# Patient Record
Sex: FEMALE | Race: AMERICAN INDIAN OR ALASKA NATIVE
[De-identification: names, ages, dates, MRNs, and addresses within clinical notes are randomized per-mention and may not be internally consistent; named-entity substitution may affect disease eponyms.]

---

## 2019-09-15 ENCOUNTER — HOSPITAL ENCOUNTER (EMERGENCY)
Dept: HOSPITAL 5 - ED | Age: 19
LOS: 1 days | Discharge: HOME | End: 2019-09-16
Payer: SELF-PAY

## 2019-09-15 DIAGNOSIS — Z79.899: ICD-10-CM

## 2019-09-15 DIAGNOSIS — R11.2: Primary | ICD-10-CM

## 2019-09-15 LAB
BILIRUB UR QL STRIP: (no result)
BLOOD UR QL VISUAL: (no result)
BUN SERPL-MCNC: 10 MG/DL (ref 7–17)
BUN/CREAT SERPL: 17 %
CALCIUM SERPL-MCNC: 9.2 MG/DL (ref 8.4–10.2)
HCT VFR BLD CALC: 36.6 % (ref 30.3–42.9)
HEMOLYSIS INDEX: 26
HGB BLD-MCNC: 11.8 GM/DL (ref 10.1–14.3)
MCHC RBC AUTO-ENTMCNC: 32 % (ref 30–34)
MCV RBC AUTO: 78 FL (ref 79–97)
MUCOUS THREADS #/AREA URNS HPF: (no result) /HPF
PH UR STRIP: 6 [PH] (ref 5–7)
PLATELET # BLD: 277 K/MM3 (ref 140–440)
PROT UR STRIP-MCNC: (no result) MG/DL
RBC # BLD AUTO: 4.7 M/MM3 (ref 3.65–5.03)
RBC #/AREA URNS HPF: 3 /HPF (ref 0–6)
UROBILINOGEN UR-MCNC: < 2 MG/DL (ref ?–2)
WBC #/AREA URNS HPF: 1 /HPF (ref 0–6)

## 2019-09-15 PROCEDURE — 85027 COMPLETE CBC AUTOMATED: CPT

## 2019-09-15 PROCEDURE — 96361 HYDRATE IV INFUSION ADD-ON: CPT

## 2019-09-15 PROCEDURE — 36415 COLL VENOUS BLD VENIPUNCTURE: CPT

## 2019-09-15 PROCEDURE — 81001 URINALYSIS AUTO W/SCOPE: CPT

## 2019-09-15 PROCEDURE — 80048 BASIC METABOLIC PNL TOTAL CA: CPT

## 2019-09-15 PROCEDURE — 81025 URINE PREGNANCY TEST: CPT

## 2019-09-15 PROCEDURE — 99283 EMERGENCY DEPT VISIT LOW MDM: CPT

## 2019-09-15 PROCEDURE — 96374 THER/PROPH/DIAG INJ IV PUSH: CPT

## 2019-09-15 NOTE — EMERGENCY DEPARTMENT REPORT
Vomiting/Diarrhea





- HPI


Chief Complaint: Nausea/Vomiting/Diarrhea


Stated Complaint: N/V/FATIGUE


Time Seen by Provider: 09/15/19 18:26


Duration: 2 Days


Nausea/Vomiting Severity: Moderate


Diarrhea Severity: None


Pain Location: Other (no abdominal pain)


Pain Severity: None


Symptoms: Yes Family w/ Similar Symptoms, No Watery Diarrhea, No Bloody 

diarrhea, No Fever, No Able to Tolerate Fluids, No Recent Unusual Foods, No Re

cent Untreated Water, No Recent use of Antibiotics, No Contacts w/ Similar 

Symptoms, No Rash, No Hematuria, No Recent URI Symptoms


Other History: This is a 18-year-old female here reported that she has been 

having nausea and vomiting for 2 days and she thinks she might be pregnant.  

Last menstrual period was 08/17/2019.  She says she is able to tolerate fluids. 

Denies any diarrhea.  Denies vomiting blood.  She says she vomited twice today 

but she is having mostly nausea.  She says she is able to keep liquids down.  

Denies any fever or chills or any vaginal bleeding or discharge.  Denies any 

urinary burning, frequency or urgency.





ED Review of Systems


ROS: 


Stated complaint: N/V/FATIGUE


Other details as noted in HPI





Constitutional: denies: chills, fever


ENT: denies: throat pain, congestion


Respiratory: denies: cough, shortness of breath, wheezing


Cardiovascular: denies: chest pain, palpitations, edema, syncope


Gastrointestinal: nausea, vomiting.  denies: abdominal pain, diarrhea, 

constipation, hematemesis, melena, hematochezia


Genitourinary: denies: urgency, dysuria, frequency, hematuria, discharge, 

abnormal menses


Musculoskeletal: denies: back pain, joint swelling, arthralgia, myalgia


Skin: denies: rash


Neurological: denies: headache, numbness, paresthesias, abnormal gait





ED Past Medical Hx





- Past Medical History


Previous Medical History?: No





- Surgical History


Past Surgical History?: No





- Family History


Family history: hypertension





- Social History


Smoking Status: Never Smoker


Substance Use Type: None





- Medications


Home Medications: 


                                Home Medications











 Medication  Instructions  Recorded  Confirmed  Last Taken  Type


 


Dicyclomine [Bentyl] 20 mg PO Q8H 3 Days #9 tablet 09/16/19  Unknown Rx


 


Famotidine [Pepcid] 20 mg PO BID 6 Days #12 tablet 09/16/19  Unknown Rx


 


Ondansetron [Zofran Odt] 4 mg PO Q8HR PRN #12 tab.rapdis 09/16/19  Unknown Rx














Vomiting Diarrhea Exam





- Exam


General: 


Vital signs noted. No distress. Alert and acting appropriately.


This is a 18-year-old female well-nourished well-developed in no acute distress.


HEENT: Yes Moist Mucous Membranes, No Pharyngeal Erythema, No Pharyngeal 

Exudates, No Rhinorrhea, No Conjuctival Injection, No Frontal Tenderness, No 

Maxillary Tenderness


Neck: No Adenopathy, No Rigidity


Lungs: Yes Clear Lung Sounds, Yes Good Air Exchange, No Wheezes, No Stridor, No 

Cough, No Nasal Flaring, No Retractions


Heart exam: Regular: Yes, Murmur: No, Tachycardia: No


Abdomen: Tenderness: No, Peritoneal Signs: No, Distention: No, Hyperactive Bowel

sounds: No


Skin exam: Rash: No, Edema: No, Normal turgor: Yes (no rash or lesion.  Clean 

dry and intact)


Neurologic: 


Alert and oriented, no deficits.








Musculoskeletal: 


Unremarkable.





No cce. + 2 pulses in all extremities, no neurovascular compromise





ED Course


                                   Vital Signs











  09/15/19





  18:27


 


Temperature 98.7 F


 


Pulse Rate 68


 


Respiratory 18





Rate 


 


Blood Pressure 98/69


 


O2 Sat by Pulse 98





Oximetry 














- Reevaluation(s)


Reevaluation #1: 





09/16/19 00:17


Patient given IV fluids 1 L and emergency room.  She was also given Zofran 4 mg

IV.  Labs review and stable with urinalysis stable and negative .  CBC and BMP 

stable and pregnancy test is negative.  Patient is feeling better and able to 

tolerate fluids after 1 L of normal saline given.








ED Medical Decision Making





- Lab Data


Result diagrams: 


                                 09/15/19 19:25





                                 09/15/19 19:25





                                   Lab Results











  09/15/19 09/15/19 09/15/19 Range/Units





  19:25 19:25 Unknown 


 


WBC  7.1    (4.5-11.0)  K/mm3


 


RBC  4.70    (3.65-5.03)  M/mm3


 


Hgb  11.8    (10.1-14.3)  gm/dl


 


Hct  36.6    (30.3-42.9)  %


 


MCV  78 L    (79-97)  fl


 


MCH  25 L    (28-32)  pg


 


MCHC  32    (30-34)  %


 


RDW  13.9    (13.2-15.2)  %


 


Plt Count  277    (140-440)  K/mm3


 


Sodium   136 L   (137-145)  mmol/L


 


Potassium   4.0   (3.6-5.0)  mmol/L


 


Chloride   101.1   ()  mmol/L


 


Carbon Dioxide   23   (22-30)  mmol/L


 


Anion Gap   16   mmol/L


 


BUN   10   (7-17)  mg/dL


 


Creatinine   0.6 L   (0.7-1.2)  mg/dL


 


Estimated GFR   > 60   ml/min


 


BUN/Creatinine Ratio   17   %


 


Glucose   86   ()  mg/dL


 


Calcium   9.2   (8.4-10.2)  mg/dL


 


Urine Color    Yellow  (Yellow)  


 


Urine Turbidity    Clear  (Clear)  


 


Urine pH    6.0  (5.0-7.0)  


 


Ur Specific Gravity    1.029  (1.003-1.030)  


 


Urine Protein    <15 mg/dl  (Negative)  mg/dL


 


Urine Glucose (UA)    Neg  (Negative)  mg/dL


 


Urine Ketones    Neg  (Negative)  mg/dL


 


Urine Blood    Neg  (Negative)  


 


Urine Nitrite    Neg  (Negative)  


 


Urine Bilirubin    Neg  (Negative)  


 


Urine Urobilinogen    < 2.0  (<2.0)  mg/dL


 


Ur Leukocyte Esterase    Neg  (Negative)  


 


Urine WBC (Auto)    1.0  (0.0-6.0)  /HPF


 


Urine RBC (Auto)    3.0  (0.0-6.0)  /HPF


 


U Epithel Cells (Auto)    2.0  (0-13.0)  /HPF


 


Urine Mucus    1+  /HPF


 


Urine HCG, Qual    Negative  (Negative)  














- Medical Decision Making





This is a 18-year-old female here report nausea and vomiting over 2 days and 

thinking that she is pregnant.  She is found to have mild nausea and vomiting 

without any abdominal pain.  She was given IV fluid and Zofran emergency room 

and she is able to tolerate oral liquids.  Her labs were stable to include CBC, 

BMP and urinalysis.  Pregnancy test is negative.  I discussed the patient 

results and diagnosis and treatment plan and she voiced understanding.  Vital 

signs stable she is afebrile and she is not having any pain.  Patient discharged

home in stable condition with prescription for Bentyl and Zofran and to follow 

up with primary care physician and 2 days or to return to the emergency room for

symptoms worsens.


Critical care attestation.: 


If time is entered above; I have spent that time in minutes in the direct care 

of this critically ill patient, excluding procedure time.








ED Disposition


Clinical Impression: 


Nausea & vomiting


Qualifiers:


 Vomiting type: unspecified Vomiting Intractability: non-intractable Qualified 

Code(s): R11.2 - Nausea with vomiting, unspecified





Disposition: DC-01 TO HOME OR SELFCARE


Is pt being admited?: No


Does the pt Need Aspirin: No


Condition: Stable


Instructions:  Acute Nausea and Vomiting (ED)


Additional Instructions: 


Please keep hydrated with Gatorade and water at least 2-3 L daily


Follow up with  primary care physician in 2 days


Take medication prescribed


If condition worsens, return to the emergency room


Referrals: 


PRIMARY CARE,MD [Primary Care Provider] - 09/17/19


[facility] [Outside] - 09/17/19


Forms:  Work/School Release Form(ED)

## 2019-09-15 NOTE — EVENT NOTE
ED Screening Note


Date of service: 09/15/19


Time: 18:27


ED Screening Note: 





This is a 18 y.o. F. that presents to the ER with n/v and fatigue for 3 days.





Patient have nexplanon implant.





Denies chest pain, SOB, palpitations, abdominal pain, fever, or chills.





LMP 08/17/20019





This initial assessment/diagnostic orders/clinical plan/treatment(s) is/are 

subject to change based on patients health status, clinical progression and re-

assessment by fellow clinical providers in the ED. Further treatment and workup 

at subsequent clinical providers discretion. Patient/guardian urged not to elope

from the ED as their condition may be serious if not clinically assessed and 

managed. 





Initial orders include: 





Labs

## 2019-09-16 VITALS — SYSTOLIC BLOOD PRESSURE: 101 MMHG | DIASTOLIC BLOOD PRESSURE: 70 MMHG

## 2019-10-04 ENCOUNTER — HOSPITAL ENCOUNTER (EMERGENCY)
Dept: HOSPITAL 5 - ED | Age: 19
Discharge: HOME | End: 2019-10-04
Payer: SELF-PAY

## 2019-10-04 DIAGNOSIS — N93.9: Primary | ICD-10-CM

## 2019-10-04 LAB
BASOPHILS # (AUTO): 0 K/MM3 (ref 0–0.1)
BASOPHILS NFR BLD AUTO: 0.9 % (ref 0–1.8)
EOSINOPHIL # BLD AUTO: 0.1 K/MM3 (ref 0–0.4)
EOSINOPHIL NFR BLD AUTO: 1.7 % (ref 0–4.3)
HCT VFR BLD CALC: 34 % (ref 30.3–42.9)
HGB BLD-MCNC: 11.2 GM/DL (ref 10.1–14.3)
LYMPHOCYTES # BLD AUTO: 1.8 K/MM3 (ref 1.2–5.4)
LYMPHOCYTES NFR BLD AUTO: 30.8 % (ref 13.4–35)
MCHC RBC AUTO-ENTMCNC: 33 % (ref 30–34)
MCV RBC AUTO: 77 FL (ref 79–97)
MONOCYTES # (AUTO): 0.4 K/MM3 (ref 0–0.8)
MONOCYTES % (AUTO): 6.3 % (ref 0–7.3)
PLATELET # BLD: 236 K/MM3 (ref 140–440)
RBC # BLD AUTO: 4.4 M/MM3 (ref 3.65–5.03)

## 2019-10-04 PROCEDURE — 36415 COLL VENOUS BLD VENIPUNCTURE: CPT

## 2019-10-04 PROCEDURE — 85025 COMPLETE CBC W/AUTO DIFF WBC: CPT

## 2019-10-04 NOTE — EMERGENCY DEPARTMENT REPORT
<NORM TOLEDO - Last Filed: 10/04/19 19:07>





ED Female  HPI





- General


Chief complaint: Vaginal Bleeding


Stated complaint: 2WKS OF VAG BLEED


Time Seen by Provider: 10/04/19 16:48


Source: patient


Mode of arrival: Ambulatory


Limitations: No Limitations





- History of Present Illness


Initial comments: 





18 year old -American female presents to the ER with complaints of 

vaginal bleeding.  Patient reports that her cycle has lasted 1 weeks longer.  

Patient denies abdominal pain no nausea no vomiting no diarrhea no dizziness to 

shortness of breath or chest pain.  Patient does admit this is happened before. 

Patient reports that she is been placed on intra-abdominal birth control.  

Patient is  2 para 3.  Last menstrual.  Was 2019.


MD Complaint: vaginal bleeding


Onset/Timin


-: week(s)


Severity scale (0 -10): 0


Are you Pregnant Now?: No


Last Menstrual Period: 19 ([place])


EDC: 18


Associated Symptoms: denies other symptoms





- Related Data


Sexually active: Yes


: 2


Para: 3


                                  Previous Rx's











 Medication  Instructions  Recorded  Last Taken  Type


 


Dicyclomine [Bentyl] 20 mg PO Q8H 3 Days #9 tablet 19 Unknown Rx


 


Famotidine [Pepcid] 20 mg PO BID 6 Days #12 tablet 19 Unknown Rx


 


Ondansetron [Zofran Odt] 4 mg PO Q8HR PRN #12 tab.rapdis 19 Unknown Rx











                                    Allergies











Allergy/AdvReac Type Severity Reaction Status Date / Time


 


No Known Allergies Allergy   Verified 09/15/19 23:07














ED Review of Systems


Comment: All other systems reviewed and negative


Genitourinary: abnormal menses





ED Past Medical Hx





- Past Medical History


Previous Medical History?: No





- Surgical History


Past Surgical History?: No





- Social History


Smoking Status: Never Smoker


Substance Use Type: None





- Medications


Home Medications: 


                                Home Medications











 Medication  Instructions  Recorded  Confirmed  Last Taken  Type


 


Dicyclomine [Bentyl] 20 mg PO Q8H 3 Days #9 tablet 19  Unknown Rx


 


Famotidine [Pepcid] 20 mg PO BID 6 Days #12 tablet 19  Unknown Rx


 


Ondansetron [Zofran Odt] 4 mg PO Q8HR PRN #12 tab.rapdis 19  Unknown Rx














ED Physical Exam





- General


Limitations: No Limitations


General appearance: alert, in no apparent distress





- Head


Head exam: Present: atraumatic, normocephalic





- Eye


Eye exam: Present: normal appearance





- ENT


ENT exam: Present: mucous membranes moist





- Neck


Neck exam: Present: normal inspection





- Respiratory


Respiratory exam: Present: normal lung sounds bilaterally.  Absent: respiratory 

distress





- Cardiovascular


Cardiovascular Exam: Present: regular rate, normal rhythm.  Absent: systolic 

murmur, diastolic murmur, rubs, gallop





- GI/Abdominal


GI/Abdominal exam: Present: soft, normal bowel sounds





- Extremities Exam


Extremities exam: Present: normal inspection





- Back Exam


Back exam: Present: normal inspection





- Neurological Exam


Neurological exam: Present: alert, oriented X3





- Psychiatric


Psychiatric exam: Present: normal affect, normal mood





- Skin


Skin exam: Present: warm, dry, intact, normal color.  Absent: rash





ED Medical Decision Making





- Lab Data


Result diagrams: 


                                 10/04/19 17:36











                                Laboratory Tests











  10/04/19





  17:36


 


WBC  5.8


 


RBC  4.40


 


Hgb  11.2


 


Hct  34.0


 


MCV  77 L


 


MCH  25 L


 


MCHC  33


 


RDW  14.4


 


Plt Count  236


 


Lymph % (Auto)  30.8


 


Mono % (Auto)  6.3


 


Eos % (Auto)  1.7


 


Baso % (Auto)  0.9


 


Lymph #  1.8


 


Mono #  0.4


 


Eos #  0.1


 


Baso #  0.0


 


Seg Neutrophils %  60.3


 


Seg Neutrophils #  3.5














- Medical Decision Making





18 year old -American female presents to the ER with complaints of 

vaginal bleeding.  Patient reports that her cycle has lasted 1 weeks longer.  

Patient denies abdominal pain no nausea no vomiting no diarrhea no dizziness to 

shortness of breath or chest pain.  Patient does admit this is happened before. 

Patient reports that she is been placed on intra-abdominal birth control.  

Patient is  2 para 3.  Last menstrual.  Was 2019.








CBC hCG serum has been ordered.





ED Disposition


Clinical Impression: 


 Abnormal bleeding in menstrual cycle





Disposition: DC-01 TO HOME OR SELFCARE


Is pt being admited?: No


Does the pt Need Aspirin: No


Condition: Stable


Instructions:  Menorrhagia (ED)


Additional Instructions: 


Follow-up with OB/GYN.  Lab work was stable.


Referrals: 


PRIMARY CARE,MD [Primary Care Provider] - 3-5 Days


[facility] [Provider Group] - 3-5 Days


Forms:  Work/School Release Form(ED)





<THANH ALBARRAN - Last Filed: 10/04/19 21:38>





ED Review of Systems


ROS: 


Stated complaint: 2WKS OF VAG BLEED


Other details as noted in HPI








ED Course





                                   Vital Signs











  10/04/19





  16:49


 


Temperature 98.2 F


 


Pulse Rate 62


 


Respiratory 18





Rate 


 


Blood Pressure 110/75


 


O2 Sat by Pulse 100





Oximetry 














ED Medical Decision Making





- Lab Data


Result diagrams: 


                                 10/04/19 17:36





Critical care attestation.: 


If time is entered above; I have spent that time in minutes in the direct care 

of this critically ill patient, excluding procedure time.

## 2019-10-04 NOTE — EVENT NOTE
ED Screening Note


ED Screening Note: 





vaginal bleeding x 2 weeks


cycles typically last for a week


had nexplanon placed for 3 years 


no abd pain


no fever 


no urinary sx 





PMHx none 


no allergies to meds 





This initial assessment/diagnostic orders/clinical plan/treatment(s) is/are 

subject to change based on patients health status, clinical progression and re-

assessment by fellow clinical providers in the ED. Further treatment and workup 

at subsequent clinical providers discretion. Patient/guardian urged not to elope

from the ED as their condition may be serious if not clinically assessed and 

managed. 





Initial orders include: CBC

## 2019-10-05 VITALS — DIASTOLIC BLOOD PRESSURE: 70 MMHG | SYSTOLIC BLOOD PRESSURE: 112 MMHG

## 2021-01-20 ENCOUNTER — HOSPITAL ENCOUNTER (EMERGENCY)
Dept: HOSPITAL 5 - ED | Age: 21
LOS: 1 days | Discharge: HOME | End: 2021-01-21
Payer: SELF-PAY

## 2021-01-20 DIAGNOSIS — Z79.899: ICD-10-CM

## 2021-01-20 DIAGNOSIS — Z98.890: ICD-10-CM

## 2021-01-20 DIAGNOSIS — N76.0: Primary | ICD-10-CM

## 2021-01-20 PROCEDURE — 99281 EMR DPT VST MAYX REQ PHY/QHP: CPT

## 2022-07-26 ENCOUNTER — HOSPITAL ENCOUNTER (EMERGENCY)
Dept: HOSPITAL 5 - ED | Age: 22
Discharge: HOME | End: 2022-07-26
Payer: MEDICAID

## 2022-07-26 VITALS — SYSTOLIC BLOOD PRESSURE: 109 MMHG | DIASTOLIC BLOOD PRESSURE: 59 MMHG

## 2022-07-26 DIAGNOSIS — N39.0: Primary | ICD-10-CM

## 2022-07-26 LAB
MUCOUS THREADS #/AREA URNS HPF: (no result) /HPF
PH UR STRIP: 6 [PH] (ref 5–7)
PROT UR STRIP-MCNC: (no result) MG/DL
RBC #/AREA URNS HPF: 13 /HPF (ref 0–6)
UROBILINOGEN UR-MCNC: < 2 MG/DL (ref ?–2)
WBC #/AREA URNS HPF: 29 /HPF (ref 0–6)

## 2022-07-26 PROCEDURE — 81001 URINALYSIS AUTO W/SCOPE: CPT

## 2022-07-26 PROCEDURE — 96372 THER/PROPH/DIAG INJ SC/IM: CPT

## 2022-07-26 PROCEDURE — 81025 URINE PREGNANCY TEST: CPT

## 2022-07-26 PROCEDURE — 87086 URINE CULTURE/COLONY COUNT: CPT

## 2022-07-26 PROCEDURE — 99283 EMERGENCY DEPT VISIT LOW MDM: CPT

## 2022-07-26 NOTE — EMERGENCY DEPARTMENT REPORT
ED Dysuria HPI





- HPI


Stated Complaint: VAGINAL IRRITATION/DISCHARGE


Time Seen by Provider: 07/26/22 10:40


Duration: 2 Days


Symptoms: Dysuria: Yes, Frequency: No, Suprapubic Pain: No, Flank Pain: No, 

Fever: No, Hematuria: No, Abdominal Pain: No, Previous UTI's: Yes


Other History: 22 yo comes to ER with painful urinatlon. No n/v/d. No back pain.

No abd pain. No fever or chills. Pt vag dc. Pt has hx of UTI and is concerned 

she has one now.





ED Review of Systems


ROS: 


Stated complaint: VAGINAL IRRITATION/DISCHARGE


Other details as noted in HPI





Comment: All other systems reviewed and negative





ED Past Medical Hx





- Past Medical History


Previous Medical History?: No





- Surgical History


Past Surgical History?: Yes


Additional Surgical History: CSection





- Family History


Family history: no significant





- Social History


Smoking Status: Never Smoker


Substance Use Type: None





- Medications


Home Medications: 


                                Home Medications











 Medication  Instructions  Recorded  Confirmed  Last Taken  Type


 


metroNIDAZOLE [Flagyl] 2,000 mg PO ONCE #4 tablet 01/20/21  Unknown Rx


 


Sulfamethoxazole/Trimethoprim 1 each PO BID #10 tablet 07/26/22  Unknown Rx





[Bactrim DS TAB]     














Dysuria Exam





- Exam


General: 


Vital signs noted. No distress. Alert and acting appropriately.





Exam: Yes Moist Mucous Membranes, No CVA Tenderness, No Abdominal Tenderness, No

Rigidity or Guarding





ED Medical Decision Making





- Medical Decision Making





                                   Lab Results











  07/26/22 Range/Units





  Unknown 


 


Urine Color  Yellow  (Yellow)  


 


Urine Turbidity  Clear  (Clear)  


 


Urine pH  6.0  (5.0-7.0)  


 


Ur Specific Gravity  1.025  (1.003-1.030)  


 


Urine Protein  <30 mg dl  (Negative)  mg/dL


 


Urine Glucose (UA)  Negative  (Negative)  mg/dL


 


Urine Ketones  Negative  (Negative)  mg/dL


 


Urine Blood  Trace  (Negative)  


 


Urine Nitrite  Negative  (Negative)  


 


Ur Reducing Substances  Not Reportable  


 


Urine Bilirubin  Negative  (Negative)  


 


Urine Ictotest  Not Reportable  


 


Urine Urobilinogen  < 2.0  (<2.0)  mg/dL


 


Ur Leukocyte Esterase  Trace  (Negative)  


 


Urine WBC (Auto)  29.0 H  (0.0-6.0)  /HPF


 


Urine RBC (Auto)  13.0  (0.0-6.0)  /HPF


 


U Epithel Cells (Auto)  19.0 H  (0-13.0)  /HPF


 


Urine Mucus  3+  /HPF


 


Urine HCG, Qual  Negative  (Negative)  








                                   Vital Signs











  07/26/22 07/26/22





  10:38 17:58


 


Temperature 98.9 F 


 


Pulse Rate 67 67


 


Respiratory 14 14





Rate  


 


Blood Pressure 109/59 


 


Blood Pressure  109/59





[Right]  


 


O2 Sat by Pulse 98 98





Oximetry  








ua noted





mediated with rocephin IM in ER





dc home on bactrum





   culture pending -we should call her if antibiotic needs changed





exam unremarkable





no fever. normal VS. ambulatory and taking po








pt being dc home with dc paln of care including diet, meds, activity and follow 

up. She verbalizes understanding of plan of care. 





- Differential Diagnosis


ro uti/preg/pylo


Critical care attestation.: 


If time is entered above; I have spent that time in minutes in the direct care 

of this critically ill patient, excluding procedure time.








ED Disposition


Clinical Impression: 


UTI (urinary tract infection)


Qualifiers:


 Urinary tract infection type: site unspecified Hematuria presence: without 

hematuria Qualified Code(s): N39.0 - Urinary tract infection, site not specified





Disposition: 01 HOME / SELF CARE / HOMELESS


Is pt being admited?: No


Does the pt Need Aspirin: No


Condition: Stable


Instructions:  Urinary Tract Infection, Adult


Additional Instructions: 


stay well hydrated with water





med as given to you today





follow up with pcp and or obgyn asap


referrrals below





motrin or tylenol for pain


Prescriptions: 


Sulfamethoxazole/Trimethoprim [Bactrim DS TAB] 1 each PO BID #10 tablet


Referrals: 


VERENICE KENNEY MD [Primary Care Provider] - 3-5 Days


HUMBERTO COFFEY MD [Staff Physician] - 3-5 Days


Forms:  Work/School Release Form(ED)


Time of Disposition: 14:55

## 2022-08-04 ENCOUNTER — HOSPITAL ENCOUNTER (EMERGENCY)
Dept: HOSPITAL 5 - ED | Age: 22
Discharge: LEFT BEFORE DISCHARGE | End: 2022-08-04
Payer: MEDICAID

## 2022-08-04 VITALS — SYSTOLIC BLOOD PRESSURE: 112 MMHG | DIASTOLIC BLOOD PRESSURE: 78 MMHG

## 2022-08-04 DIAGNOSIS — N89.8: Primary | ICD-10-CM

## 2022-08-04 PROCEDURE — 99281 EMR DPT VST MAYX REQ PHY/QHP: CPT

## 2022-08-04 NOTE — EMERGENCY DEPARTMENT REPORT
ED Female  HPI





- General


Chief complaint: Urogenital-Female


Stated complaint: DISCHARGE


Time Seen by Provider: 08/04/22 08:38


Source: patient


Mode of arrival: Ambulatory


Limitations: No Limitations





- History of Present Illness


Initial comments: 





Patient comes to the emergency room complaining of vaginal discharge.  She was 

recently treated for UTI.  She states that she gets BV often and that she always

comes here to get tested for BV.  I reviewed the EMR and she has had numerous 

visits to the ER for vaginal discharge none resulting in STD or micro testing.  

Have explained to her that we do not do routine testing in the emergency room.  

She has no fever or chills.  No abdominal pain.  No back pain.  Her exam is 

unremarkable.  Her vital signs are normal.  I explained to her that I am going 

to refer her to OB/GYN for appropriate Pap smears and gynecological testing.





Last menstrual cycle 2 weeks ago.


MD Complaint: vaginal bleeding


-: Gradual, month(s)


Consistency: constant


Improves with: none


Worsens with: none


Are you Pregnant Now?: No


Associated Symptoms: denies other symptoms, vaginal discharge.  denies: vaginal 

bleeding, abdominal pain, nausea/vomiting, fever/chills, headaches, loss of 

appetite, dysuria, hematuria, rash, seizure, shortness of breath, syncope, 

weakness





- Related Data


Sexually active: Yes


                                    Allergies











Allergy/AdvReac Type Severity Reaction Status Date / Time


 


No Known Allergies Allergy   Verified 08/04/22 08:26














ED Review of Systems


ROS: 


Stated complaint: DISCHARGE


Other details as noted in HPI





Comment: All other systems reviewed and negative





ED Past Medical Hx





- Past Medical History


Previous Medical History?: No





- Surgical History


Past Surgical History?: Yes


Additional Surgical History: CSection





- Family History


Family history: no significant





- Social History


Smoking Status: Never Smoker


Substance Use Type: None





ED Physical Exam





- General


Limitations: No Limitations


General appearance: alert, in no apparent distress





- Head


Head exam: Present: atraumatic, normocephalic





- Eye


Eye exam: Present: normal appearance





- ENT


ENT exam: Present: mucous membranes moist





- Neck


Neck exam: Present: normal inspection





- Respiratory


Respiratory exam: Present: normal lung sounds bilaterally.  Absent: respiratory 

distress





- Cardiovascular


Cardiovascular Exam: Present: regular rate, normal rhythm.  Absent: systolic 

murmur, diastolic murmur, rubs, gallop





- GI/Abdominal


GI/Abdominal exam: Present: soft, normal bowel sounds





- Extremities Exam


Extremities exam: Present: normal inspection





- Back Exam


Back exam: Present: normal inspection





- Neurological Exam


Neurological exam: Present: alert, oriented X3





- Psychiatric


Psychiatric exam: Present: normal affect, normal mood





- Skin


Skin exam: Present: warm, dry, intact, normal color.  Absent: rash





ED Medical Decision Making





- Medical Decision Making





referral to obgyn per pt request for she threw the other one away





normal VS per manual documentation of RN











- Differential Diagnosis


a/c bv


Critical care attestation.: 


If time is entered above; I have spent that time in minutes in the direct care 

of this critically ill patient, excluding procedure time.








ED Disposition


Clinical Impression: 


 Vaginal discharge





Disposition: 07 LEFT WITHOUT BEING SEEN


Is pt being admited?: No


Does the pt Need Aspirin: No


Condition: Stable


Referrals: 


HUMBERTO COFFEY MD [Staff Physician] - 3-5 Days


Forms:  Work/School Release Form(ED)


Time of Disposition: 08:40

## 2024-09-20 NOTE — EMERGENCY DEPARTMENT REPORT
Assessment & Plan     External hemorrhoids  Patient is here today evaulation of hemorrhoid and does in fact have external hemorrhoid on exam. Recommend Miralax daily until stool soft, steroid cream and sitz baths. Follow up if symptoms worsen or do not improve.  - hydrocortisone, Perianal, (HYDROCORTISONE) 2.5 % cream  Dispense: 30 g; Refill: 0            Depression Screening Follow Up        9/20/2024     1:40 PM   PHQ   PHQ-9 Total Score 17   Q9: Thoughts of better off dead/self-harm past 2 weeks Not at all         9/20/2024     1:40 PM   Last PHQ-9   1.  Little interest or pleasure in doing things 3   2.  Feeling down, depressed, or hopeless 3   3.  Trouble falling or staying asleep, or sleeping too much 2   4.  Feeling tired or having little energy 3   5.  Poor appetite or overeating 2   6.  Feeling bad about yourself 1   7.  Trouble concentrating 2   8.  Moving slowly or restless 1   Q9: Thoughts of better off dead/self-harm past 2 weeks 0   PHQ-9 Total Score 17         Follow Up Actions Taken  Referred patient back to PCP       Risks, benefits and alternatives were discussed with patient. Agreeable to the plan of care.      Subjective   Dianne is a 31 year old, presenting for the following health issues:  Rectal Problem (Has a history of hemorrhoids.  They come and go.  Sometimes worse than others.  Has tried OTC creams.  )      9/20/2024     1:57 PM   Additional Questions   Roomed by SUMMER Padron. CMA([facility])     History of Present Illness       Reason for visit:  Hemorrhoids   She is taking medications regularly.           Patient is here today for evaluation of hemorrhoids  Has had them for 15 years since first pregnancy  This hemorrhoid has been present for 1 week  Started with constipation  Non bleeding hemorrhoid  Has been treating with OTC cream        Review of Systems  Constitutional, HEENT, cardiovascular, pulmonary, gi and gu systems are negative, except as otherwise noted.      Objective    /70    ED Female  HPI





- General


Chief complaint: Urogenital-Female


Stated complaint: VAGINAL DISCHARGE


Time Seen by Provider: 01/20/21 23:37


Source: patient


Mode of arrival: Ambulatory


Limitations: No Limitations





- History of Present Illness


Initial comments: 





20-year-old man who was at emergency department complaining of having bacterial 

vaginosis and seeking treatment.  States she has no worries of an STD or yeast 

infection and that she 100% knows this is bacterial vaginosis and only wants the

medication to get rid of it


MD Complaint: vaginal discharge


Consistency: constant


Improves with: none


Worsens with: none


Are you Pregnant Now?: No


Associated Symptoms: vaginal discharge.  denies: abdominal pain, 

nausea/vomiting, headaches, loss of appetite, hematuria, shortness of breath, 

syncope, weakness





- Related Data


                                  Previous Rx's











 Medication  Instructions  Recorded  Last Taken  Type


 


Dicyclomine [Bentyl] 20 mg PO Q8H 3 Days #9 tablet 09/16/19 Unknown Rx


 


Famotidine [Pepcid] 20 mg PO BID 6 Days #12 tablet 09/16/19 Unknown Rx


 


Ondansetron [Zofran Odt] 4 mg PO Q8HR PRN #12 tab.rapdis 09/16/19 Unknown Rx


 


metroNIDAZOLE [Flagyl] 2,000 mg PO ONCE #4 tablet 01/20/21 Unknown Rx











                                    Allergies











Allergy/AdvReac Type Severity Reaction Status Date / Time


 


No Known Allergies Allergy   Verified 06/04/20 11:02














ED Review of Systems


ROS: 


Stated complaint: VAGINAL DISCHARGE


Other details as noted in HPI





Comment: All other systems reviewed and negative





ED Past Medical Hx





- Past Medical History


Previous Medical History?: No





- Surgical History


Past Surgical History?: Yes


Additional Surgical History: CSection





- Social History


Smoking Status: Never Smoker


Substance Use Type: None





- Medications


Home Medications: 


                                Home Medications











 Medication  Instructions  Recorded  Confirmed  Last Taken  Type


 


Dicyclomine [Bentyl] 20 mg PO Q8H 3 Days #9 tablet 09/16/19  Unknown Rx


 


Famotidine [Pepcid] 20 mg PO BID 6 Days #12 tablet 09/16/19  Unknown Rx


 


Ondansetron [Zofran Odt] 4 mg PO Q8HR PRN #12 tab.rapdis 09/16/19  Unknown Rx


 


metroNIDAZOLE [Flagyl] 2,000 mg PO ONCE #4 tablet 01/20/21  Unknown Rx














ED Physical Exam





- General


Limitations: No Limitations


General appearance: alert, in no apparent distress





- Head


Head exam: Present: atraumatic, normocephalic





- Eye


Eye exam: Present: normal appearance, PERRL, EOMI


Pupils: Present: normal accommodation





- ENT


ENT exam: Present: mucous membranes moist





- Neck


Neck exam: Present: normal inspection





- Respiratory


Respiratory exam: Present: normal lung sounds bilaterally.  Absent: respiratory 

distress





- Cardiovascular


Cardiovascular Exam: Present: regular rate, normal rhythm.  Absent: systolic 

murmur, diastolic murmur, rubs, gallop





- GI/Abdominal


GI/Abdominal exam: Present: soft, normal bowel sounds





- Extremities Exam


Extremities exam: Present: normal inspection





- Back Exam


Back exam: Present: normal inspection





- Neurological Exam


Neurological exam: Present: alert, oriented X3





- Psychiatric


Psychiatric exam: Present: normal affect, normal mood





- Skin


Skin exam: Present: warm, dry, intact, normal color.  Absent: rash


Critical care attestation.: 


If time is entered above; I have spent that time in minutes in the direct care 

of this critically ill patient, excluding procedure time.








ED Disposition


Clinical Impression: 


 Vaginitis





Disposition: DC-01 TO HOME OR SELFCARE


Is pt being admited?: No


Does the pt Need Aspirin: No


Condition: Stable


Instructions:  Bacterial Vaginosis, Easy-to-Read, Vaginitis, Easy-to-Read


Prescriptions: 


metroNIDAZOLE [Flagyl] 2,000 mg PO ONCE #4 tablet


Referrals: 


[facility] [Provider Group] - 3-5 Days "Pulse 86   Temp 98.2  F (36.8  C) (Oral)   Resp 18   Ht 1.581 m (5' 2.25\")   Wt 52.2 kg (115 lb)   LMP 08/20/2024 (Approximate)   SpO2 99%   BMI 20.87 kg/m    Body mass index is 20.87 kg/m .  Physical Exam   GENERAL: alert and no distress  NECK: no adenopathy, no asymmetry, masses, or scars  RESP: lungs clear to auscultation - no rales, rhonchi or wheezes  CV: regular rate and rhythm, normal S1 S2, no S3 or S4, no murmur, click or rub, no peripheral edema  ABDOMEN: soft, nontender, no hepatosplenomegaly, no masses and bowel sounds normal  RECTAL (female): external hemorrhoid  MS: no gross musculoskeletal defects noted, no edema          Signed Electronically by: Cornelia Barba PA-C    "